# Patient Record
Sex: MALE | NOT HISPANIC OR LATINO
[De-identification: names, ages, dates, MRNs, and addresses within clinical notes are randomized per-mention and may not be internally consistent; named-entity substitution may affect disease eponyms.]

---

## 2017-01-18 ENCOUNTER — MEDICATION RENEWAL (OUTPATIENT)
Age: 67
End: 2017-01-18

## 2017-03-01 ENCOUNTER — NON-APPOINTMENT (OUTPATIENT)
Age: 67
End: 2017-03-01

## 2017-03-01 ENCOUNTER — APPOINTMENT (OUTPATIENT)
Dept: CARDIOLOGY | Facility: CLINIC | Age: 67
End: 2017-03-01

## 2017-03-01 VITALS
WEIGHT: 130 LBS | DIASTOLIC BLOOD PRESSURE: 75 MMHG | HEIGHT: 66 IN | SYSTOLIC BLOOD PRESSURE: 127 MMHG | BODY MASS INDEX: 20.89 KG/M2 | OXYGEN SATURATION: 99 % | RESPIRATION RATE: 16 BRPM | HEART RATE: 72 BPM

## 2017-03-01 RX ORDER — METOPROLOL TARTRATE 25 MG/1
25 TABLET, FILM COATED ORAL TWICE DAILY
Qty: 180 | Refills: 3 | Status: DISCONTINUED | COMMUNITY
Start: 2017-01-18 | End: 2017-03-01

## 2017-04-10 ENCOUNTER — RX RENEWAL (OUTPATIENT)
Age: 67
End: 2017-04-10

## 2017-06-20 ENCOUNTER — APPOINTMENT (OUTPATIENT)
Dept: CARDIOLOGY | Facility: CLINIC | Age: 67
End: 2017-06-20

## 2017-07-10 ENCOUNTER — APPOINTMENT (OUTPATIENT)
Dept: CARDIOLOGY | Facility: CLINIC | Age: 67
End: 2017-07-10

## 2017-07-10 ENCOUNTER — NON-APPOINTMENT (OUTPATIENT)
Age: 67
End: 2017-07-10

## 2017-07-10 VITALS
DIASTOLIC BLOOD PRESSURE: 69 MMHG | HEART RATE: 88 BPM | WEIGHT: 134 LBS | HEIGHT: 66 IN | OXYGEN SATURATION: 97 % | SYSTOLIC BLOOD PRESSURE: 112 MMHG | BODY MASS INDEX: 21.53 KG/M2

## 2017-07-10 DIAGNOSIS — R42 DIZZINESS AND GIDDINESS: ICD-10-CM

## 2017-07-10 DIAGNOSIS — H53.8 OTHER VISUAL DISTURBANCES: ICD-10-CM

## 2017-07-19 ENCOUNTER — APPOINTMENT (OUTPATIENT)
Dept: CV DIAGNOSITCS | Facility: HOSPITAL | Age: 67
End: 2017-07-19

## 2017-08-11 ENCOUNTER — APPOINTMENT (OUTPATIENT)
Dept: CV DIAGNOSITCS | Facility: HOSPITAL | Age: 67
End: 2017-08-11

## 2017-10-18 ENCOUNTER — NON-APPOINTMENT (OUTPATIENT)
Age: 67
End: 2017-10-18

## 2017-10-18 ENCOUNTER — APPOINTMENT (OUTPATIENT)
Dept: CARDIOLOGY | Facility: CLINIC | Age: 67
End: 2017-10-18
Payer: COMMERCIAL

## 2017-10-18 VITALS
HEART RATE: 76 BPM | RESPIRATION RATE: 16 BRPM | OXYGEN SATURATION: 97 % | HEIGHT: 66 IN | DIASTOLIC BLOOD PRESSURE: 81 MMHG | SYSTOLIC BLOOD PRESSURE: 134 MMHG | WEIGHT: 134 LBS | BODY MASS INDEX: 21.53 KG/M2

## 2017-10-18 PROCEDURE — 99214 OFFICE O/P EST MOD 30 MIN: CPT

## 2017-10-18 PROCEDURE — 93000 ELECTROCARDIOGRAM COMPLETE: CPT

## 2017-12-25 ENCOUNTER — EMERGENCY (EMERGENCY)
Facility: HOSPITAL | Age: 67
LOS: 1 days | Discharge: HOME | End: 2017-12-25
Attending: EMERGENCY MEDICINE | Admitting: EMERGENCY MEDICINE
Payer: COMMERCIAL

## 2017-12-25 VITALS
SYSTOLIC BLOOD PRESSURE: 176 MMHG | DIASTOLIC BLOOD PRESSURE: 72 MMHG | TEMPERATURE: 98 F | HEART RATE: 116 BPM | OXYGEN SATURATION: 99 %

## 2017-12-25 LAB
ALBUMIN SERPL ELPH-MCNC: 4.5 G/DL — SIGNIFICANT CHANGE UP (ref 3.3–5)
ALP SERPL-CCNC: 73 U/L — SIGNIFICANT CHANGE UP (ref 40–120)
ALT FLD-CCNC: 87 U/L — HIGH (ref 4–41)
APTT BLD: 32.9 SEC — SIGNIFICANT CHANGE UP (ref 27.5–37.4)
AST SERPL-CCNC: 57 U/L — HIGH (ref 4–40)
BASOPHILS # BLD AUTO: 0.09 K/UL — SIGNIFICANT CHANGE UP (ref 0–0.2)
BASOPHILS NFR BLD AUTO: 1.1 % — SIGNIFICANT CHANGE UP (ref 0–2)
BILIRUB SERPL-MCNC: 0.3 MG/DL — SIGNIFICANT CHANGE UP (ref 0.2–1.2)
BUN SERPL-MCNC: 14 MG/DL — SIGNIFICANT CHANGE UP (ref 7–23)
CALCIUM SERPL-MCNC: 9.5 MG/DL — SIGNIFICANT CHANGE UP (ref 8.4–10.5)
CHLORIDE SERPL-SCNC: 96 MMOL/L — LOW (ref 98–107)
CK MB BLD-MCNC: 1.84 NG/ML — SIGNIFICANT CHANGE UP (ref 1–6.6)
CK SERPL-CCNC: 98 U/L — SIGNIFICANT CHANGE UP (ref 30–200)
CO2 SERPL-SCNC: 24 MMOL/L — SIGNIFICANT CHANGE UP (ref 22–31)
CREAT SERPL-MCNC: 1.24 MG/DL — SIGNIFICANT CHANGE UP (ref 0.5–1.3)
EOSINOPHIL # BLD AUTO: 0.16 K/UL — SIGNIFICANT CHANGE UP (ref 0–0.5)
EOSINOPHIL NFR BLD AUTO: 2 % — SIGNIFICANT CHANGE UP (ref 0–6)
GLUCOSE SERPL-MCNC: 141 MG/DL — HIGH (ref 70–99)
HCT VFR BLD CALC: 47.1 % — SIGNIFICANT CHANGE UP (ref 39–50)
HGB BLD-MCNC: 15.1 G/DL — SIGNIFICANT CHANGE UP (ref 13–17)
IMM GRANULOCYTES # BLD AUTO: 0.06 # — SIGNIFICANT CHANGE UP
IMM GRANULOCYTES NFR BLD AUTO: 0.7 % — SIGNIFICANT CHANGE UP (ref 0–1.5)
INR BLD: 0.95 — SIGNIFICANT CHANGE UP (ref 0.88–1.17)
LYMPHOCYTES # BLD AUTO: 2.68 K/UL — SIGNIFICANT CHANGE UP (ref 1–3.3)
LYMPHOCYTES # BLD AUTO: 33.2 % — SIGNIFICANT CHANGE UP (ref 13–44)
MCHC RBC-ENTMCNC: 27.8 PG — SIGNIFICANT CHANGE UP (ref 27–34)
MCHC RBC-ENTMCNC: 32.1 % — SIGNIFICANT CHANGE UP (ref 32–36)
MCV RBC AUTO: 86.6 FL — SIGNIFICANT CHANGE UP (ref 80–100)
MONOCYTES # BLD AUTO: 0.8 K/UL — SIGNIFICANT CHANGE UP (ref 0–0.9)
MONOCYTES NFR BLD AUTO: 9.9 % — SIGNIFICANT CHANGE UP (ref 2–14)
NEUTROPHILS # BLD AUTO: 4.29 K/UL — SIGNIFICANT CHANGE UP (ref 1.8–7.4)
NEUTROPHILS NFR BLD AUTO: 53.1 % — SIGNIFICANT CHANGE UP (ref 43–77)
NRBC # FLD: 0.02 — SIGNIFICANT CHANGE UP
PLATELET # BLD AUTO: 289 K/UL — SIGNIFICANT CHANGE UP (ref 150–400)
PMV BLD: 9.9 FL — SIGNIFICANT CHANGE UP (ref 7–13)
POTASSIUM SERPL-MCNC: 4.6 MMOL/L — SIGNIFICANT CHANGE UP (ref 3.5–5.3)
POTASSIUM SERPL-SCNC: 4.6 MMOL/L — SIGNIFICANT CHANGE UP (ref 3.5–5.3)
PROT SERPL-MCNC: 8 G/DL — SIGNIFICANT CHANGE UP (ref 6–8.3)
PROTHROM AB SERPL-ACNC: 10.9 SEC — SIGNIFICANT CHANGE UP (ref 9.8–13.1)
RBC # BLD: 5.44 M/UL — SIGNIFICANT CHANGE UP (ref 4.2–5.8)
RBC # FLD: 13.9 % — SIGNIFICANT CHANGE UP (ref 10.3–14.5)
SODIUM SERPL-SCNC: 139 MMOL/L — SIGNIFICANT CHANGE UP (ref 135–145)
TROPONIN T SERPL-MCNC: < 0.06 NG/ML — SIGNIFICANT CHANGE UP (ref 0–0.06)
WBC # BLD: 8.08 K/UL — SIGNIFICANT CHANGE UP (ref 3.8–10.5)
WBC # FLD AUTO: 8.08 K/UL — SIGNIFICANT CHANGE UP (ref 3.8–10.5)

## 2017-12-25 PROCEDURE — 71020: CPT | Mod: 26

## 2017-12-25 PROCEDURE — 99285 EMERGENCY DEPT VISIT HI MDM: CPT

## 2017-12-25 RX ORDER — ASPIRIN/CALCIUM CARB/MAGNESIUM 324 MG
162 TABLET ORAL ONCE
Qty: 0 | Refills: 0 | Status: COMPLETED | OUTPATIENT
Start: 2017-12-25 | End: 2017-12-25

## 2017-12-25 RX ORDER — SODIUM CHLORIDE 9 MG/ML
500 INJECTION INTRAMUSCULAR; INTRAVENOUS; SUBCUTANEOUS ONCE
Qty: 0 | Refills: 0 | Status: COMPLETED | OUTPATIENT
Start: 2017-12-25 | End: 2017-12-25

## 2017-12-25 RX ADMIN — SODIUM CHLORIDE 500 MILLILITER(S): 9 INJECTION INTRAMUSCULAR; INTRAVENOUS; SUBCUTANEOUS at 23:43

## 2017-12-25 RX ADMIN — Medication 162 MILLIGRAM(S): at 22:38

## 2017-12-25 NOTE — ED ADULT NURSE NOTE - OBJECTIVE STATEMENT
Michelle RN: The patient is a 66 y/o male a&ox4 p/w a c/c of "chest heaviness and shortness of breath," that began at 1930 hours this evening while watching television.  The patient reported he acutely began to experience the sensation of palpitations and shortness of breath while watching television, took his HR and BP at home, noted HR in 120s and SBP above 180.  The patient has a PMH of HTN, HLD, DM type 2, BPH, and AMI in 2012 with stent placement (patient unable to elaborate on number of stents.)  The patient reported the sensation of chest heaviness and SOB lasted about 1.5 hours, at present denies CP, SOB, palpitations.  Patient noted to be hypertensive and tachycardic (sinus) at this time.  Patient denies HA, blurry vision, diplopia, N/V/D, diaphoresis.  Patient in nad, 20 gauge PIV placed in right AC, will continue to monitor.

## 2017-12-25 NOTE — ED ADULT TRIAGE NOTE - CCCP TRG CHIEF CMPLNT
- Unclear source  - Prior bx/IVUS results negative for CAV  - start broad spec abx including vanco x1 , continue cefepime and azithromycin (renal dose)  - consult Transplant ID for assistance  - Pan culture including respiratory panel, flu and strep -yana, CMV pending.   - Bcx NGTD  - CXR + for recollection to the R- but not significant for repeat paracentesis.  - CT chest w/ Moderate volume right pleural fluid that is worsening (chronic).  Bilateral diffuse patchy consolidation and groundglass densities that are new since radiograph 11/30/2017 and appear worse than chest radiograph 12/10/2017.  - DDx: Pneumonia   - Per ID, thoracentesis if possible. Appreciate help of Pulmonary    - may have paracentesis Friday for volume removal, will space out procedures   - no symptoms of UTI, however GNR, lactose  > 100,000 in urine (do not need to treat per ID)  - patient states cough medicine does not work, afebrile since admit. Will treat symptoms accordingly. (try promethazine-codeine today)   chest heaviness

## 2017-12-25 NOTE — ED PROVIDER NOTE - OBJECTIVE STATEMENT
67 year-old male with history of CAD, HTN, HLD, DM2, PCI on Plavix presents to the Emergency Department for chest pain.  Patient mentions the discomfort started approx. 4 hours ago 67 year-old male with history of CAD, HTN, HLD, DM2, PCI on Plavix presents to the Emergency Department for chest pain.  Patient mentions the discomfort started approx. 4 hours ago.  CP left-sided in nature with SOB.  No fevers, chills, nausea, vomiting, diaphoresis.  Took BP at home and was 180/120.  Mentions taking his Lopressor and coming in to the ED for evaluation.  Received ASA by EMS.  Mentions having no CP, SOB at this time.  No LE swelling, no complaints of orthopnea. 67 year-old male with history of CAD, HTN, HLD, DM2, PCI on Plavix presents to the Emergency Department for chest pain.  Patient mentions the discomfort started approx. 4 hours ago.  CP left-sided in nature with SOB.  No fevers, chills, nausea, vomiting, diaphoresis.  Took BP at home and was 180/120.  Mentions taking his Lopressor and coming in to the ED for evaluation.  Received ASA by EMS.  Mentions having no CP, SOB at this time.  No LE swelling, no complaints of orthopnea.    Attending/Jc: 68 yo M w/ extensive cardiac hx including cath/stent p/w acute onset of SSCP ~1930 tonight described as pressure-like and associated w/ mild SOB. Pain lasted ~ 2 hours. denies palp weakness, diaphoresis or n/v.

## 2017-12-25 NOTE — ED ADULT TRIAGE NOTE - CHIEF COMPLAINT QUOTE
c.o left sided chest heaviness and shortness of breath since 7pm while watching tv. took blood pressure at home with 180 systolic and 120 hr. took 25 lopressor ER at 730pm. pmh HTN MI with 1 stent DM

## 2017-12-25 NOTE — ED PROVIDER NOTE - MEDICAL DECISION MAKING DETAILS
A/P 68 yo M r/o ACS  -EKG, labs, CXR A/P 68 yo M r/o ACS  -EKG, labs, CXR    Kimi BRANNON: Patient p/w CP + SOB now resolved with prior history of cardiac problems.  Likely unstable angina.  R/O ACS and admit for echo + serial troponin.

## 2017-12-25 NOTE — ED PROVIDER NOTE - PHYSICAL EXAMINATION
Attending/Jc: NAD; PERRL/EOMI, non-icterus, supple, no KENIA, no JVD, RRR, CTAB; Abd-soft, NT/ND, no HSM; no LE edema, A&Ox3, nonfocal; Skin-warm/dry

## 2017-12-26 VITALS
TEMPERATURE: 98 F | SYSTOLIC BLOOD PRESSURE: 104 MMHG | HEART RATE: 76 BPM | DIASTOLIC BLOOD PRESSURE: 62 MMHG | OXYGEN SATURATION: 97 % | RESPIRATION RATE: 16 BRPM

## 2017-12-26 DIAGNOSIS — Z95.5 PRESENCE OF CORONARY ANGIOPLASTY IMPLANT AND GRAFT: Chronic | ICD-10-CM

## 2017-12-26 LAB
CK MB BLD-MCNC: 1.49 NG/ML — SIGNIFICANT CHANGE UP (ref 1–6.6)
CK SERPL-CCNC: 83 U/L — SIGNIFICANT CHANGE UP (ref 30–200)
TROPONIN T SERPL-MCNC: < 0.06 NG/ML — SIGNIFICANT CHANGE UP (ref 0–0.06)

## 2017-12-26 PROCEDURE — 93018 CV STRESS TEST I&R ONLY: CPT | Mod: GC

## 2017-12-26 PROCEDURE — 93306 TTE W/DOPPLER COMPLETE: CPT | Mod: 26

## 2017-12-26 PROCEDURE — 93016 CV STRESS TEST SUPVJ ONLY: CPT | Mod: GC

## 2017-12-26 PROCEDURE — 99235 HOSP IP/OBS SAME DATE MOD 70: CPT

## 2017-12-26 PROCEDURE — 78452 HT MUSCLE IMAGE SPECT MULT: CPT | Mod: 26

## 2017-12-26 RX ORDER — LATANOPROST 0.05 MG/ML
1 SOLUTION/ DROPS OPHTHALMIC; TOPICAL AT BEDTIME
Qty: 0 | Refills: 0 | Status: DISCONTINUED | OUTPATIENT
Start: 2017-12-26 | End: 2017-12-29

## 2017-12-26 RX ORDER — LISINOPRIL 2.5 MG/1
2.5 TABLET ORAL ONCE
Qty: 0 | Refills: 0 | Status: COMPLETED | OUTPATIENT
Start: 2017-12-26 | End: 2017-12-26

## 2017-12-26 RX ORDER — SIMVASTATIN 20 MG/1
40 TABLET, FILM COATED ORAL AT BEDTIME
Qty: 0 | Refills: 0 | Status: DISCONTINUED | OUTPATIENT
Start: 2017-12-26 | End: 2017-12-29

## 2017-12-26 RX ORDER — ASPIRIN/CALCIUM CARB/MAGNESIUM 324 MG
81 TABLET ORAL DAILY
Qty: 0 | Refills: 0 | Status: DISCONTINUED | OUTPATIENT
Start: 2017-12-26 | End: 2017-12-29

## 2017-12-26 RX ORDER — CLOPIDOGREL BISULFATE 75 MG/1
75 TABLET, FILM COATED ORAL DAILY
Qty: 0 | Refills: 0 | Status: DISCONTINUED | OUTPATIENT
Start: 2017-12-26 | End: 2017-12-29

## 2017-12-26 RX ORDER — METOPROLOL TARTRATE 50 MG
25 TABLET ORAL DAILY
Qty: 0 | Refills: 0 | Status: DISCONTINUED | OUTPATIENT
Start: 2017-12-26 | End: 2017-12-26

## 2017-12-26 RX ORDER — METOPROLOL TARTRATE 50 MG
25 TABLET ORAL ONCE
Qty: 0 | Refills: 0 | Status: COMPLETED | OUTPATIENT
Start: 2017-12-26 | End: 2017-12-26

## 2017-12-26 RX ORDER — METOPROLOL TARTRATE 50 MG
25 TABLET ORAL DAILY
Qty: 0 | Refills: 0 | Status: DISCONTINUED | OUTPATIENT
Start: 2017-12-26 | End: 2017-12-29

## 2017-12-26 RX ORDER — LISINOPRIL 2.5 MG/1
2.5 TABLET ORAL DAILY
Qty: 0 | Refills: 0 | Status: DISCONTINUED | OUTPATIENT
Start: 2017-12-26 | End: 2017-12-29

## 2017-12-26 RX ORDER — METFORMIN HYDROCHLORIDE 850 MG/1
1000 TABLET ORAL
Qty: 0 | Refills: 0 | Status: DISCONTINUED | OUTPATIENT
Start: 2017-12-26 | End: 2017-12-29

## 2017-12-26 RX ADMIN — Medication 81 MILLIGRAM(S): at 12:57

## 2017-12-26 RX ADMIN — Medication 25 MILLIGRAM(S): at 00:27

## 2017-12-26 RX ADMIN — LISINOPRIL 2.5 MILLIGRAM(S): 2.5 TABLET ORAL at 10:07

## 2017-12-26 RX ADMIN — METFORMIN HYDROCHLORIDE 1000 MILLIGRAM(S): 850 TABLET ORAL at 10:07

## 2017-12-26 RX ADMIN — CLOPIDOGREL BISULFATE 75 MILLIGRAM(S): 75 TABLET, FILM COATED ORAL at 12:57

## 2017-12-26 RX ADMIN — LISINOPRIL 2.5 MILLIGRAM(S): 2.5 TABLET ORAL at 00:27

## 2017-12-26 NOTE — ED CDU PROVIDER SUBSEQUENT DAY NOTE - HISTORY
67 year-old male with history of CAD, HTN, HLD, DM2, PCI on Plavix presents to the Emergency Department for chest pain.  Patient mentions the discomfort started yesterday.  CP left-sided in nature with SOB.  No fevers, chills, nausea, vomiting, diaphoresis.  Took BP at home and was high which prompted patient to come to ED. Received ASA by EMS.  Pt with 2set of Lula negative, sent to cdu for  echo, telemonitoring, monitor and reassess. Patient laying comfortably in bed NAD,pt denies n/v, sob, cp, abd pain or any other complaints.

## 2017-12-26 NOTE — ED CDU PROVIDER INITIAL DAY NOTE - MEDICAL DECISION MAKING DETAILS
66 y/o M with c/o cp with 1st set of Lula negative, sent to cdu for 2nd set of Lula, echo, telemonitoring, monitor and reassess

## 2017-12-26 NOTE — ED CDU PROVIDER DISPOSITION NOTE - CLINICAL COURSE
12/25/17: 68yo M pmh inclusive of htn, hld, DM, CAD, s/p PCI w/ stent placement on plavix, presented to ED c/o left-sided chest pain/presssure associated w/ sob. Admitted to CDU for serial enzymes and echo.  12/26/17: I evaluated pt this morning. Pt denied any chest discomfort at that time, but given concerning story - decided to add stress test as well. Pt has thus far had 3 negative sets of Lula, normal echo and normal nuclear stress test. Will dc home and pt will f/u with cardiologist and pmd. Pt understands/agrees w/ plan.

## 2017-12-26 NOTE — ED CDU PROVIDER SUBSEQUENT DAY NOTE - MEDICAL DECISION MAKING DETAILS
68 y/o male c/o cp sob and high bp  -r/o acs  -tele, ce x 2, echo, stress  -cardiolgy follow up 66 y/o male c/o cp, sob and high bp  -3sets Lula, echo, cardiac stress test  -tele, ce x 2, echo, stress  -cardiolgy follow up

## 2017-12-26 NOTE — ED CDU PROVIDER SUBSEQUENT DAY NOTE - ATTENDING CONTRIBUTION TO CARE
CDU MD Aguirre:  I performed a face to face bedside interview with patient regarding history of present illness, review of symptoms and past medical history. I completed an independent physical exam(documented below).  I have discussed patient's plan of care with PA.   I agree with note as stated above, having amended the EMR as needed to reflect my findings. I have discussed the assessment and plan of care.  This includes during the time I functioned as the attending physician for this patient.   Gen: Alert, NAD  Head: NC, AT, EOMI, normal lids/conjunctiva  Neck: +supple, no tenderness/meningismus/JVD, +Trachea midline  Chest: no chest wall tenderness, equal chest rise  Pulm: Bilateral BS, normal resp effort, no wheeze/stridor/retractions  CV: RRR, no M/R/G, +dist pulses  Neuro: AAOx3

## 2017-12-26 NOTE — ED CDU PROVIDER DISPOSITION NOTE - ATTENDING CONTRIBUTION TO CARE
*clinical course note above written by me.  CDU MD Aguirre- Attending Discharge Note: Patient is stable.  Labs and tests reviewed with the patient.  The patient is stable for discharge.

## 2017-12-26 NOTE — ED CDU PROVIDER INITIAL DAY NOTE - OBJECTIVE STATEMENT
67 year-old male with history of CAD, HTN, HLD, DM2, PCI on Plavix presents to the Emergency Department for chest pain.  Patient mentions the discomfort started approx. 4 hours ago.  CP left-sided in nature with SOB.  No fevers, chills, nausea, vomiting, diaphoresis.  Took BP at home and was 180/120.  Mentions taking his Lopressor and coming in to the ED for evaluation.  Received ASA by EMS.  Mentions having no CP, SOB at this time.  No LE swelling, no complaints of orthopnea. Pt with 1st set of Lula negative, sent to cdu for 2nd set of Lula, echo, telemonitoring, monitor and reassess. Patient laying comfortably in bed NAD, No complaints. 67 year-old male with history of CAD, HTN, HLD, DM2, PCI on Plavix presents to the Emergency Department for chest pain.  Patient mentions the discomfort started yesterday.  CP left-sided in nature with SOB.  No fevers, chills, nausea, vomiting, diaphoresis.  Took BP at home and was 180/120.  Mentions taking his Lopressor and coming in to the ED for evaluation.  Received ASA by EMS.  Pt with 1st set of Lula negative, sent to cdu for 2nd set of Lula, echo, telemonitoring, monitor and reassess. Patient laying comfortably in bed NAD,pt denies n/v, sob, cp, abd pain or any other complaints. 67 year-old male with history of CAD, HTN, HLD, DM2, PCI on Plavix presents to the Emergency Department for chest pain.  Patient mentions the discomfort started yesterday.  CP left-sided in nature with SOB.  No fevers, chills, nausea, vomiting, diaphoresis.  Took BP at home and was 180/120.  Mentions taking his Lopressor and coming in to the ED for evaluation.  Received ASA by EMS.  Pt with 1st set of Lula negative, sent to cdu for 2nd set of Lula, echo, telemonitoring, monitor and reassess. Patient laying comfortably in bed NAD,pt denies n/v, sob, cp, abd pain or any other complaints.    Attending/Jc: 68 yo M w/ extensive cardiac hx including cath/stent p/w acute onset of SSCP ~1930 tonight described as pressure-like and associated w/ mild SOB. Pain lasted ~ 2 hours. denies palp weakness, diaphoresis or n/v.     During course of evaluation in the ED patient's symptoms improved. Intial set of CE negative.

## 2017-12-26 NOTE — ED CDU PROVIDER SUBSEQUENT DAY NOTE - PROGRESS NOTE DETAILS
Morning Rounds: Pt. feeling well without complaint. States has had no cp or sob all night. Pt. states most recent cardiac testinf was 3 years ago - no recent stress or cath. Will add on stress to echo for further evaluation.

## 2018-01-17 ENCOUNTER — APPOINTMENT (OUTPATIENT)
Dept: CARDIOLOGY | Facility: CLINIC | Age: 68
End: 2018-01-17
Payer: COMMERCIAL

## 2018-01-17 VITALS
WEIGHT: 134 LBS | BODY MASS INDEX: 21.53 KG/M2 | OXYGEN SATURATION: 98 % | HEIGHT: 66 IN | RESPIRATION RATE: 16 BRPM | DIASTOLIC BLOOD PRESSURE: 81 MMHG | HEART RATE: 80 BPM | SYSTOLIC BLOOD PRESSURE: 143 MMHG

## 2018-01-17 VITALS — SYSTOLIC BLOOD PRESSURE: 110 MMHG | DIASTOLIC BLOOD PRESSURE: 70 MMHG

## 2018-01-17 PROCEDURE — 99215 OFFICE O/P EST HI 40 MIN: CPT

## 2018-01-17 PROCEDURE — 93000 ELECTROCARDIOGRAM COMPLETE: CPT

## 2018-02-05 ENCOUNTER — MEDICATION RENEWAL (OUTPATIENT)
Age: 68
End: 2018-02-05

## 2018-04-18 ENCOUNTER — NON-APPOINTMENT (OUTPATIENT)
Age: 68
End: 2018-04-18

## 2018-04-18 ENCOUNTER — APPOINTMENT (OUTPATIENT)
Dept: CARDIOLOGY | Facility: CLINIC | Age: 68
End: 2018-04-18
Payer: COMMERCIAL

## 2018-04-18 VITALS
HEART RATE: 76 BPM | RESPIRATION RATE: 16 BRPM | BODY MASS INDEX: 21.53 KG/M2 | DIASTOLIC BLOOD PRESSURE: 69 MMHG | HEIGHT: 66 IN | SYSTOLIC BLOOD PRESSURE: 113 MMHG | OXYGEN SATURATION: 98 % | WEIGHT: 134 LBS

## 2018-04-18 PROCEDURE — 93000 ELECTROCARDIOGRAM COMPLETE: CPT

## 2018-04-18 PROCEDURE — 99215 OFFICE O/P EST HI 40 MIN: CPT

## 2018-07-13 ENCOUNTER — MEDICATION RENEWAL (OUTPATIENT)
Age: 68
End: 2018-07-13

## 2018-07-18 ENCOUNTER — NON-APPOINTMENT (OUTPATIENT)
Age: 68
End: 2018-07-18

## 2018-07-18 ENCOUNTER — APPOINTMENT (OUTPATIENT)
Dept: CARDIOLOGY | Facility: CLINIC | Age: 68
End: 2018-07-18
Payer: COMMERCIAL

## 2018-07-18 VITALS
BODY MASS INDEX: 21.53 KG/M2 | OXYGEN SATURATION: 98 % | HEART RATE: 81 BPM | RESPIRATION RATE: 16 BRPM | DIASTOLIC BLOOD PRESSURE: 70 MMHG | WEIGHT: 134 LBS | HEIGHT: 66 IN | SYSTOLIC BLOOD PRESSURE: 111 MMHG

## 2018-07-18 PROBLEM — I25.10 ATHEROSCLEROTIC HEART DISEASE OF NATIVE CORONARY ARTERY WITHOUT ANGINA PECTORIS: Chronic | Status: ACTIVE | Noted: 2017-12-26

## 2018-07-18 PROBLEM — E78.5 HYPERLIPIDEMIA, UNSPECIFIED: Chronic | Status: ACTIVE | Noted: 2017-12-26

## 2018-07-18 PROCEDURE — 93000 ELECTROCARDIOGRAM COMPLETE: CPT

## 2018-07-18 PROCEDURE — 99215 OFFICE O/P EST HI 40 MIN: CPT

## 2018-10-24 ENCOUNTER — NON-APPOINTMENT (OUTPATIENT)
Age: 68
End: 2018-10-24

## 2018-10-24 ENCOUNTER — APPOINTMENT (OUTPATIENT)
Dept: CARDIOLOGY | Facility: CLINIC | Age: 68
End: 2018-10-24
Payer: COMMERCIAL

## 2018-10-24 VITALS
DIASTOLIC BLOOD PRESSURE: 69 MMHG | SYSTOLIC BLOOD PRESSURE: 110 MMHG | HEART RATE: 76 BPM | OXYGEN SATURATION: 100 % | BODY MASS INDEX: 22.11 KG/M2 | WEIGHT: 137 LBS

## 2018-10-24 PROCEDURE — 93000 ELECTROCARDIOGRAM COMPLETE: CPT

## 2018-10-24 PROCEDURE — 99215 OFFICE O/P EST HI 40 MIN: CPT

## 2019-01-29 ENCOUNTER — APPOINTMENT (OUTPATIENT)
Dept: CARDIOLOGY | Facility: CLINIC | Age: 69
End: 2019-01-29

## 2019-05-08 ENCOUNTER — APPOINTMENT (OUTPATIENT)
Dept: CARDIOLOGY | Facility: CLINIC | Age: 69
End: 2019-05-08
Payer: MEDICARE

## 2019-05-08 ENCOUNTER — NON-APPOINTMENT (OUTPATIENT)
Age: 69
End: 2019-05-08

## 2019-05-08 VITALS
HEART RATE: 89 BPM | WEIGHT: 135 LBS | SYSTOLIC BLOOD PRESSURE: 137 MMHG | RESPIRATION RATE: 16 BRPM | OXYGEN SATURATION: 100 % | DIASTOLIC BLOOD PRESSURE: 74 MMHG | BODY MASS INDEX: 21.69 KG/M2 | HEIGHT: 66 IN

## 2019-05-08 PROCEDURE — 93000 ELECTROCARDIOGRAM COMPLETE: CPT

## 2019-05-08 PROCEDURE — 99214 OFFICE O/P EST MOD 30 MIN: CPT

## 2019-05-08 RX ORDER — PANTOPRAZOLE 40 MG/1
40 TABLET, DELAYED RELEASE ORAL DAILY
Qty: 90 | Refills: 3 | Status: DISCONTINUED | COMMUNITY
Start: 2018-01-17 | End: 2019-05-08

## 2019-05-08 NOTE — PHYSICAL EXAM
[Normal Appearance] : normal appearance [General Appearance - Well Developed] : well developed [No Deformities] : no deformities [General Appearance - Well Nourished] : well nourished [Well Groomed] : well groomed [Normal Conjunctiva] : the conjunctiva exhibited no abnormalities [General Appearance - In No Acute Distress] : no acute distress [Eyelids - No Xanthelasma] : the eyelids demonstrated no xanthelasmas [Normal Oral Mucosa] : normal oral mucosa [No Oral Cyanosis] : no oral cyanosis [No Oral Pallor] : no oral pallor [No Jugular Venous Doe A Waves] : no jugular venous doe A waves [Normal Jugular Venous A Waves Present] : normal jugular venous A waves present [Normal Jugular Venous V Waves Present] : normal jugular venous V waves present [Respiration, Rhythm And Depth] : normal respiratory rhythm and effort [Auscultation Breath Sounds / Voice Sounds] : lungs were clear to auscultation bilaterally [Exaggerated Use Of Accessory Muscles For Inspiration] : no accessory muscle use [Heart Sounds] : normal S1 and S2 [Heart Rate And Rhythm] : heart rate and rhythm were normal [Abdomen Soft] : soft [Murmurs] : no murmurs present [Abnormal Walk] : normal gait [Abdomen Tenderness] : non-tender [Abdomen Mass (___ Cm)] : no abdominal mass palpated [Gait - Sufficient For Exercise Testing] : the gait was sufficient for exercise testing [Cyanosis, Localized] : no localized cyanosis [Nail Clubbing] : no clubbing of the fingernails [Skin Color & Pigmentation] : normal skin color and pigmentation [Petechial Hemorrhages (___cm)] : no petechial hemorrhages [No Venous Stasis] : no venous stasis [] : no rash [Skin Lesions] : no skin lesions [No Xanthoma] : no  xanthoma was observed [No Skin Ulcers] : no skin ulcer [Oriented To Time, Place, And Person] : oriented to person, place, and time [Mood] : the mood was normal [Affect] : the affect was normal [No Anxiety] : not feeling anxious

## 2019-05-08 NOTE — ASSESSMENT
[FreeTextEntry1] : 1. Coronary artery disease. No signs of angina. The patient wishes to stop his clopidogrel as he now has significant co-pay's on his medications. He is more than 5 years since coronary revascularization.\par \par 2. Hypertension. Blood pressure is adequately controlled.\par \par 3. Hyperlipidemia. Continue lovastatin.\par

## 2019-05-08 NOTE — REASON FOR VISIT
[Coronary Artery Disease] : coronary artery disease [Follow-Up - Clinic] : a clinic follow-up of [Hypertension] : hypertension [Hyperlipidemia] : hyperlipidemia [FreeTextEntry1] : Since his last visit, the patient has been suffering from seasonal allergies. He has had no chest pain or shortness of breath.\par \par 1. Coronary artery disease. No signs of angina. The patient wishes to stop his clopidogrel as he now has significant co-pay's on his medications. He is more than 5 years since coronary revascularization.\par \par 2. Hypertension. Blood pressure is adequately controlled.\par \par 3. Hyperlipidemia. Continue lovastatin.\par

## 2019-05-09 ENCOUNTER — MEDICATION RENEWAL (OUTPATIENT)
Age: 69
End: 2019-05-09

## 2019-07-19 ENCOUNTER — RX RENEWAL (OUTPATIENT)
Age: 69
End: 2019-07-19

## 2019-08-13 ENCOUNTER — APPOINTMENT (OUTPATIENT)
Dept: CARDIOLOGY | Facility: CLINIC | Age: 69
End: 2019-08-13
Payer: MEDICARE

## 2019-08-13 ENCOUNTER — NON-APPOINTMENT (OUTPATIENT)
Age: 69
End: 2019-08-13

## 2019-08-13 VITALS
OXYGEN SATURATION: 99 % | HEART RATE: 76 BPM | BODY MASS INDEX: 22.5 KG/M2 | DIASTOLIC BLOOD PRESSURE: 76 MMHG | RESPIRATION RATE: 16 BRPM | HEIGHT: 66 IN | SYSTOLIC BLOOD PRESSURE: 121 MMHG | WEIGHT: 140 LBS

## 2019-08-13 PROCEDURE — 93000 ELECTROCARDIOGRAM COMPLETE: CPT

## 2019-08-13 PROCEDURE — 99214 OFFICE O/P EST MOD 30 MIN: CPT

## 2019-08-13 NOTE — REASON FOR VISIT
[Follow-Up - Clinic] : a clinic follow-up of [Coronary Artery Disease] : coronary artery disease [Hyperlipidemia] : hyperlipidemia [Hypertension] : hypertension [FreeTextEntry1] : Since his last visit, he has had no chest pain or shortness of breath. He wished to discontinue his clopidogrel, but then has decided to continue taking it. He also wishes to change his visits to every 6 months.\par \par 1. Coronary artery disease. There are no signs of angina. Continue current medications.\par \par 2. Hypertension. His blood pressure is well controlled. Continue current medications.\par \par 3. Hyperlipidemia. Continue lovastatin.\par \par \par

## 2019-08-13 NOTE — ASSESSMENT
[FreeTextEntry1] : 1. Coronary artery disease. There are no signs of angina. Continue current medications.\par \par 2. Hypertension. His blood pressure is well controlled. Continue current medications.\par \par 3. Hyperlipidemia. Continue lovastatin.\par \par \par

## 2019-08-13 NOTE — PHYSICAL EXAM
[General Appearance - Well Developed] : well developed [Normal Appearance] : normal appearance [Well Groomed] : well groomed [General Appearance - Well Nourished] : well nourished [No Deformities] : no deformities [General Appearance - In No Acute Distress] : no acute distress [Normal Conjunctiva] : the conjunctiva exhibited no abnormalities [Eyelids - No Xanthelasma] : the eyelids demonstrated no xanthelasmas [Normal Oral Mucosa] : normal oral mucosa [No Oral Pallor] : no oral pallor [No Oral Cyanosis] : no oral cyanosis [Normal Jugular Venous A Waves Present] : normal jugular venous A waves present [Normal Jugular Venous V Waves Present] : normal jugular venous V waves present [No Jugular Venous Doe A Waves] : no jugular venous doe A waves [Respiration, Rhythm And Depth] : normal respiratory rhythm and effort [Exaggerated Use Of Accessory Muscles For Inspiration] : no accessory muscle use [Auscultation Breath Sounds / Voice Sounds] : lungs were clear to auscultation bilaterally [Heart Rate And Rhythm] : heart rate and rhythm were normal [Heart Sounds] : normal S1 and S2 [Murmurs] : no murmurs present [Abdomen Soft] : soft [Abdomen Tenderness] : non-tender [Abdomen Mass (___ Cm)] : no abdominal mass palpated [Abnormal Walk] : normal gait [Gait - Sufficient For Exercise Testing] : the gait was sufficient for exercise testing [Nail Clubbing] : no clubbing of the fingernails [Cyanosis, Localized] : no localized cyanosis [Petechial Hemorrhages (___cm)] : no petechial hemorrhages [Skin Color & Pigmentation] : normal skin color and pigmentation [] : no rash [No Venous Stasis] : no venous stasis [Skin Lesions] : no skin lesions [No Skin Ulcers] : no skin ulcer [No Xanthoma] : no  xanthoma was observed [Oriented To Time, Place, And Person] : oriented to person, place, and time [Affect] : the affect was normal [No Anxiety] : not feeling anxious [Mood] : the mood was normal

## 2019-09-23 NOTE — ED ADULT TRIAGE NOTE - STATUS:
Post Anesthesia Note





- EVALUATION WITHIN 48HRS OF ANESTHETIC


Vital Signs in Normal Range: Yes


Patient Participated in Evaluation: Yes


Respiratory Function Stable: Yes


Airway Patent: Yes


Cardiovascular Function Stable: Yes


Hydration Status Stable: Yes (Feels a little dizzy at times.)


Pain Control Satisfactory: Yes


Nausea and Vomiting Control Satisfactory: Yes (little nausea on and off. 

Sitting up in chair. Nurse with. )


Mental Status Recovered: Yes


Vital Signs: 


 Last Vital Signs











Temp  98.1 F   09/23/19 00:35


 


Pulse  69   09/23/19 04:31


 


Resp  17   09/23/19 00:35


 


BP  100/56 L  09/23/19 04:02


 


Pulse Ox  100   09/23/19 04:31
Intact

## 2020-02-18 ENCOUNTER — APPOINTMENT (OUTPATIENT)
Dept: CARDIOLOGY | Facility: CLINIC | Age: 70
End: 2020-02-18
Payer: MEDICARE

## 2020-02-18 ENCOUNTER — NON-APPOINTMENT (OUTPATIENT)
Age: 70
End: 2020-02-18

## 2020-02-18 VITALS — BODY MASS INDEX: 21.63 KG/M2 | WEIGHT: 134 LBS

## 2020-02-18 VITALS
HEIGHT: 66 IN | OXYGEN SATURATION: 98 % | HEART RATE: 83 BPM | SYSTOLIC BLOOD PRESSURE: 123 MMHG | DIASTOLIC BLOOD PRESSURE: 71 MMHG | BODY MASS INDEX: 21.63 KG/M2

## 2020-02-18 DIAGNOSIS — I25.10 ATHEROSCLEROTIC HEART DISEASE OF NATIVE CORONARY ARTERY W/OUT ANGINA PECTORIS: ICD-10-CM

## 2020-02-18 DIAGNOSIS — E78.5 HYPERLIPIDEMIA, UNSPECIFIED: ICD-10-CM

## 2020-02-18 PROCEDURE — 93000 ELECTROCARDIOGRAM COMPLETE: CPT

## 2020-02-18 PROCEDURE — 99214 OFFICE O/P EST MOD 30 MIN: CPT

## 2020-02-18 NOTE — ASSESSMENT
[FreeTextEntry1] : 1.  CAD.  No signs of angina.  Continue current medications.\par \par 2.  Hypertension.  Blood pressure is well controlled.\par \par 3.  Hyperlipidemia.  His blood work is being checked by his endocrinologist.  We will obtain his blood work.

## 2020-02-18 NOTE — REASON FOR VISIT
[Follow-Up - Clinic] : a clinic follow-up of [Coronary Artery Disease] : coronary artery disease [Hyperlipidemia] : hyperlipidemia [FreeTextEntry1] : Since his last visit, he has been doing well.  He has had no chest pain or shortness of breath.  He did not receive his cane, and consequently has not been walking.  His balance has worsened and he has lost muscle mass.\par \par His blood work is being checked by his endocrinologist, and his last hemoglobin A1c was approximately 8.  He also notes having had a vascular study performed, but we do not have the results.\par \par 1.  CAD.  No signs of angina.  Continue current medications.\par \par 2.  Hypertension.  Blood pressure is well controlled.\par \par 3.  Hyperlipidemia.  His blood work is being checked by his endocrinologist.  We will obtain his blood work. [Hypertension] : hypertension

## 2020-02-18 NOTE — PHYSICAL EXAM
[General Appearance - Well Developed] : well developed [Normal Appearance] : normal appearance [General Appearance - Well Nourished] : well nourished [Well Groomed] : well groomed [No Deformities] : no deformities [General Appearance - In No Acute Distress] : no acute distress [Normal Conjunctiva] : the conjunctiva exhibited no abnormalities [Normal Oral Mucosa] : normal oral mucosa [Eyelids - No Xanthelasma] : the eyelids demonstrated no xanthelasmas [No Oral Cyanosis] : no oral cyanosis [No Oral Pallor] : no oral pallor [Normal Jugular Venous V Waves Present] : normal jugular venous V waves present [Normal Jugular Venous A Waves Present] : normal jugular venous A waves present [No Jugular Venous Doe A Waves] : no jugular venous doe A waves [Respiration, Rhythm And Depth] : normal respiratory rhythm and effort [Auscultation Breath Sounds / Voice Sounds] : lungs were clear to auscultation bilaterally [Exaggerated Use Of Accessory Muscles For Inspiration] : no accessory muscle use [Heart Rate And Rhythm] : heart rate and rhythm were normal [Heart Sounds] : normal S1 and S2 [Murmurs] : no murmurs present [Abdomen Tenderness] : non-tender [Abdomen Soft] : soft [Abdomen Mass (___ Cm)] : no abdominal mass palpated [Abnormal Walk] : normal gait [Gait - Sufficient For Exercise Testing] : the gait was sufficient for exercise testing [Nail Clubbing] : no clubbing of the fingernails [Petechial Hemorrhages (___cm)] : no petechial hemorrhages [Cyanosis, Localized] : no localized cyanosis [Skin Color & Pigmentation] : normal skin color and pigmentation [] : no rash [No Venous Stasis] : no venous stasis [Skin Lesions] : no skin lesions [No Skin Ulcers] : no skin ulcer [No Xanthoma] : no  xanthoma was observed [Affect] : the affect was normal [Oriented To Time, Place, And Person] : oriented to person, place, and time [Mood] : the mood was normal [No Anxiety] : not feeling anxious

## 2020-07-30 NOTE — ED ADULT NURSE NOTE - CAS EDP DISCH TYPE
Patient is a 60 year old male that presents to ED arrives with wife with slurred and generalized weakness. Slurred speech started around 1145 today and weakness has been since yesterday per wife. Patient is currently AAOx3, disoriented to time. 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 Wife states patient's urine has been concentrated last couple days. Patient also has hx of multiple CVA, DM, HTN, and CHF. Denies any fevers, chills, and SOB.   
Pt has unknown LKW. Wife states he seemed a little weaker yesterday and has had some slurred speech since around 1145 today.  
Home

## 2020-09-01 ENCOUNTER — APPOINTMENT (OUTPATIENT)
Dept: CARDIOLOGY | Facility: CLINIC | Age: 70
End: 2020-09-01

## 2021-04-15 ENCOUNTER — RX RENEWAL (OUTPATIENT)
Age: 71
End: 2021-04-15

## 2021-05-05 ENCOUNTER — RX RENEWAL (OUTPATIENT)
Age: 71
End: 2021-05-05

## 2021-07-16 NOTE — ED PROVIDER NOTE - PROGRESS NOTE DETAILS
Lot # (Optional): [id number] Kimi BRANNON: Spoke with patient's PCP and he is in agreement to the plan of admitting patient to CDU for cardiac eval.

## 2022-02-16 ENCOUNTER — RX RENEWAL (OUTPATIENT)
Age: 72
End: 2022-02-16

## 2022-02-16 RX ORDER — METOPROLOL SUCCINATE 25 MG/1
25 TABLET, EXTENDED RELEASE ORAL DAILY
Qty: 90 | Refills: 3 | Status: ACTIVE | COMMUNITY
Start: 2017-03-01 | End: 1900-01-01

## 2022-04-21 ENCOUNTER — RX RENEWAL (OUTPATIENT)
Age: 72
End: 2022-04-21

## 2022-07-20 ENCOUNTER — RX RENEWAL (OUTPATIENT)
Age: 72
End: 2022-07-20

## 2022-07-20 RX ORDER — LOVASTATIN 20 MG/1
20 TABLET ORAL
Qty: 90 | Refills: 0 | Status: ACTIVE | COMMUNITY
Start: 2018-07-13 | End: 1900-01-01

## 2022-12-19 ENCOUNTER — OFFICE (OUTPATIENT)
Dept: URBAN - METROPOLITAN AREA CLINIC 35 | Facility: CLINIC | Age: 72
Setting detail: OPHTHALMOLOGY
End: 2022-12-19
Payer: MEDICARE

## 2022-12-19 ENCOUNTER — RX ONLY (RX ONLY)
Age: 72
End: 2022-12-19

## 2022-12-19 DIAGNOSIS — H02.31: ICD-10-CM

## 2022-12-19 DIAGNOSIS — E11.9: ICD-10-CM

## 2022-12-19 DIAGNOSIS — H02.34: ICD-10-CM

## 2022-12-19 DIAGNOSIS — H40.1131: ICD-10-CM

## 2022-12-19 DIAGNOSIS — H25.13: ICD-10-CM

## 2022-12-19 PROBLEM — H52.7 REFRACTIVE ERROR: Status: ACTIVE | Noted: 2022-12-19

## 2022-12-19 PROBLEM — H11.153 PINGUECULA; BOTH EYES: Status: ACTIVE | Noted: 2022-12-19

## 2022-12-19 PROCEDURE — 92004 COMPRE OPH EXAM NEW PT 1/>: CPT | Performed by: OPHTHALMOLOGY

## 2022-12-19 PROCEDURE — 92250 FUNDUS PHOTOGRAPHY W/I&R: CPT | Performed by: OPHTHALMOLOGY

## 2022-12-19 PROCEDURE — 92133 CPTRZD OPH DX IMG PST SGM ON: CPT | Performed by: OPHTHALMOLOGY

## 2022-12-19 ASSESSMENT — AXIALLENGTH_DERIVED
OD_AL: 21.8306
OS_AL: 22.1215
OS_AL: 21.9516
OD_AL: 22.0411

## 2022-12-19 ASSESSMENT — REFRACTION_CURRENTRX
OS_ADD: +3.00
OD_AXIS: 010
OS_AXIS: 175
OD_CYLINDER: +0.75
OD_ADD: +3.00
OS_SPHERE: +1.75
OS_CYLINDER: +0.75
OD_SPHERE: +2.00
OS_VPRISM_DIRECTION: PROGS
OD_OVR_VA: 20/
OS_OVR_VA: 20/
OD_VPRISM_DIRECTION: PROGS

## 2022-12-19 ASSESSMENT — CONFRONTATIONAL VISUAL FIELD TEST (CVF)
OD_FINDINGS: FULL
OS_FINDINGS: FULL

## 2022-12-19 ASSESSMENT — SPHEQUIV_DERIVED
OD_SPHEQUIV: 2.375
OD_SPHEQUIV: 3
OS_SPHEQUIV: 2.375
OS_SPHEQUIV: 2.875

## 2022-12-19 ASSESSMENT — REFRACTION_MANIFEST
OS_CYLINDER: +1.25
OD_AXIS: 180
OS_SPHERE: +1.75
OS_AXIS: 180
OD_CYLINDER: +0.75
OS_VA1: 20/40
OD_VA1: 20/20
OD_SPHERE: +2.00

## 2022-12-19 ASSESSMENT — TONOMETRY
OS_IOP_MMHG: 12
OD_IOP_MMHG: 11

## 2022-12-19 ASSESSMENT — REFRACTION_AUTOREFRACTION
OS_AXIS: 165
OD_CYLINDER: +1.50
OS_SPHERE: +2.00
OD_AXIS: 005
OS_CYLINDER: +1.75
OD_SPHERE: +2.25

## 2022-12-19 ASSESSMENT — VISUAL ACUITY
OD_BCVA: 20/60+2
OS_BCVA: 20/20-

## 2022-12-19 ASSESSMENT — KERATOMETRY
OD_AXISANGLE_DEGREES: 002
OS_K2POWER_DIOPTERS: 45.75
OD_K1POWER_DIOPTERS: 45.00
OD_K2POWER_DIOPTERS: 46.00
OS_K1POWER_DIOPTERS: 44.75
OS_AXISANGLE_DEGREES: 168

## 2022-12-19 ASSESSMENT — LID POSITION - COMMENTS
OD_COMMENTS: BLEPHAROCHALASIS
OS_COMMENTS: BLEPHAROCHALASIS

## 2023-01-26 NOTE — ED CDU PROVIDER SUBSEQUENT DAY NOTE - MUSCULOSKELETAL, MLM
Monitor: The problem is stable.  Evaluation: No labs/tests required today.  Assessment/Treatment:  Managed by specialist.   Referral to cardiology provided to patient. Will try to get sooner appt to establish care.  Recommended to continue entresto 24-26 BID, diltiazem 240mg daily, amlodipine 10mg daily, and ramipril 10mg daily.  Will increase dose of lasix to 40mg BID.  Had physician who is coming to his home currently for management of his care.  Advised to follow up in 4 weeks.   Spine appears normal, range of motion is not limited, no muscle or joint tenderness

## 2023-04-11 ENCOUNTER — OFFICE (OUTPATIENT)
Dept: URBAN - METROPOLITAN AREA CLINIC 35 | Facility: CLINIC | Age: 73
Setting detail: OPHTHALMOLOGY
End: 2023-04-11
Payer: MEDICARE

## 2023-04-11 DIAGNOSIS — H02.31: ICD-10-CM

## 2023-04-11 DIAGNOSIS — H40.1131: ICD-10-CM

## 2023-04-11 DIAGNOSIS — H25.13: ICD-10-CM

## 2023-04-11 DIAGNOSIS — H02.34: ICD-10-CM

## 2023-04-11 PROCEDURE — 92012 INTRM OPH EXAM EST PATIENT: CPT | Performed by: OPHTHALMOLOGY

## 2023-04-11 PROCEDURE — 92083 EXTENDED VISUAL FIELD XM: CPT | Performed by: OPHTHALMOLOGY

## 2023-04-11 ASSESSMENT — REFRACTION_CURRENTRX
OS_ADD: +3.00
OD_AXIS: 010
OS_OVR_VA: 20/
OS_VPRISM_DIRECTION: PROGS
OS_ADD: +3.00
OD_ADD: +3.00
OS_CYLINDER: +0.75
OS_AXIS: 178
OS_SPHERE: +1.75
OD_ADD: +3.00
OS_CYLINDER: +0.75
OS_OVR_VA: 20/
OD_VPRISM_DIRECTION: PROGS
OD_OVR_VA: 20/
OS_VPRISM_DIRECTION: PROGS
OD_CYLINDER: +0.75
OD_OVR_VA: 20/
OD_SPHERE: +2.00
OS_AXIS: 175
OD_CYLINDER: +0.75
OD_VPRISM_DIRECTION: PROGS
OD_SPHERE: +1.75
OD_AXIS: 010
OS_SPHERE: +2.00

## 2023-04-11 ASSESSMENT — KERATOMETRY
OS_K2POWER_DIOPTERS: 45.75
OS_K1POWER_DIOPTERS: 44.75
OS_AXISANGLE_DEGREES: 177
OD_AXISANGLE_DEGREES: 011
OD_K2POWER_DIOPTERS: 45.75
OD_K1POWER_DIOPTERS: 45.50

## 2023-04-11 ASSESSMENT — VISUAL ACUITY
OD_BCVA: 20/30-2
OS_BCVA: 20/40

## 2023-04-11 ASSESSMENT — SPHEQUIV_DERIVED
OD_SPHEQUIV: 3
OS_SPHEQUIV: 2.375
OD_SPHEQUIV: 2.375
OS_SPHEQUIV: 2.75

## 2023-04-11 ASSESSMENT — REFRACTION_AUTOREFRACTION
OS_CYLINDER: +1.50
OD_SPHERE: +2.25
OS_SPHERE: +2.00
OD_CYLINDER: +1.50
OD_AXIS: 014
OS_AXIS: 170

## 2023-04-11 ASSESSMENT — REFRACTION_MANIFEST
OS_VA1: 20/40
OS_SPHERE: +1.75
OD_SPHERE: +2.00
OS_AXIS: 180
OD_AXIS: 180
OD_VA1: 20/20
OS_CYLINDER: +1.25
OD_CYLINDER: +0.75

## 2023-04-11 ASSESSMENT — AXIALLENGTH_DERIVED
OD_AL: 21.7913
OS_AL: 22.1215
OS_AL: 21.9938
OD_AL: 22.0011

## 2023-04-11 ASSESSMENT — TONOMETRY
OS_IOP_MMHG: 10
OD_IOP_MMHG: 10

## 2023-04-11 ASSESSMENT — LID POSITION - COMMENTS
OS_COMMENTS: BLEPHAROCHALASIS
OD_COMMENTS: BLEPHAROCHALASIS

## 2023-08-14 ENCOUNTER — OFFICE (OUTPATIENT)
Dept: URBAN - METROPOLITAN AREA CLINIC 35 | Facility: CLINIC | Age: 73
Setting detail: OPHTHALMOLOGY
End: 2023-08-14
Payer: MEDICARE

## 2023-08-14 DIAGNOSIS — H25.13: ICD-10-CM

## 2023-08-14 DIAGNOSIS — H40.1131: ICD-10-CM

## 2023-08-14 DIAGNOSIS — H16.103: ICD-10-CM

## 2023-08-14 DIAGNOSIS — H11.153: ICD-10-CM

## 2023-08-14 DIAGNOSIS — E11.9: ICD-10-CM

## 2023-08-14 PROCEDURE — 92012 INTRM OPH EXAM EST PATIENT: CPT | Performed by: OPHTHALMOLOGY

## 2023-08-14 ASSESSMENT — REFRACTION_CURRENTRX
OS_AXIS: 172
OD_AXIS: 010
OS_OVR_VA: 20/
OS_ADD: +3.00
OS_OVR_VA: 20/
OD_OVR_VA: 20/
OS_CYLINDER: +0.75
OD_SPHERE: +1.75
OS_CYLINDER: +0.75
OS_CYLINDER: +0.75
OS_SPHERE: +2.00
OD_SPHERE: +1.75
OD_VPRISM_DIRECTION: PROGS
OS_ADD: +2.75
OS_VPRISM_DIRECTION: PROGS
OD_AXIS: 010
OD_VPRISM_DIRECTION: PROGS
OS_OVR_VA: 20/
OD_VPRISM_DIRECTION: PROGS
OD_CYLINDER: +0.75
OD_OVR_VA: 20/
OS_AXIS: 175
OD_OVR_VA: 20/
OS_SPHERE: +1.75
OD_CYLINDER: +0.75
OD_AXIS: 008
OS_VPRISM_DIRECTION: PROGS
OS_AXIS: 178
OS_ADD: +3.00
OD_CYLINDER: +0.75
OD_ADD: +3.25
OS_VPRISM_DIRECTION: PROGS
OD_ADD: +3.00
OD_SPHERE: +2.00
OS_SPHERE: +2.00
OD_ADD: +3.00

## 2023-08-14 ASSESSMENT — REFRACTION_MANIFEST
OS_VA1: 20/40
OD_VA1: 20/20
OS_SPHERE: +1.75
OS_AXIS: 180
OD_AXIS: 180
OD_CYLINDER: +0.75
OS_CYLINDER: +1.25
OD_SPHERE: +2.00

## 2023-08-14 ASSESSMENT — TONOMETRY
OD_IOP_MMHG: 13
OS_IOP_MMHG: 13

## 2023-08-14 ASSESSMENT — SPHEQUIV_DERIVED
OS_SPHEQUIV: 2.375
OD_SPHEQUIV: 3.25
OD_SPHEQUIV: 2.375
OS_SPHEQUIV: 3

## 2023-08-14 ASSESSMENT — KERATOMETRY
OD_K1POWER_DIOPTERS: 45.25
OD_K2POWER_DIOPTERS: 46.00
OS_K2POWER_DIOPTERS: 45.75
OS_AXISANGLE_DEGREES: 175
OD_AXISANGLE_DEGREES: 004
OS_K1POWER_DIOPTERS: 44.75

## 2023-08-14 ASSESSMENT — AXIALLENGTH_DERIVED
OD_AL: 22.0011
OS_AL: 22.1215
OD_AL: 21.7086
OS_AL: 21.9095

## 2023-08-14 ASSESSMENT — REFRACTION_AUTOREFRACTION
OD_AXIS: 013
OD_CYLINDER: +2.00
OS_AXIS: 175
OD_SPHERE: +2.25
OS_SPHERE: +2.00
OS_CYLINDER: +2.00

## 2023-08-14 ASSESSMENT — VISUAL ACUITY
OS_BCVA: 20/50-2
OD_BCVA: 20/30-2

## 2023-08-14 ASSESSMENT — LID POSITION - COMMENTS
OD_COMMENTS: BLEPHAROCHALASIS
OS_COMMENTS: BLEPHAROCHALASIS

## 2025-07-04 ENCOUNTER — EMERGENCY (EMERGENCY)
Facility: HOSPITAL | Age: 75
LOS: 1 days | End: 2025-07-04
Attending: EMERGENCY MEDICINE

## 2025-07-04 VITALS
HEART RATE: 83 BPM | SYSTOLIC BLOOD PRESSURE: 168 MMHG | OXYGEN SATURATION: 97 % | DIASTOLIC BLOOD PRESSURE: 95 MMHG | TEMPERATURE: 98 F | WEIGHT: 134.92 LBS | HEIGHT: 66 IN | RESPIRATION RATE: 16 BRPM

## 2025-07-04 RX ORDER — ERYTHROMYCIN 5 MG/G
1 OINTMENT OPHTHALMIC
Qty: 1 | Refills: 0
Start: 2025-07-04 | End: 2025-07-10

## 2025-07-04 RX ORDER — ERYTHROMYCIN 5 MG/G
1 OINTMENT OPHTHALMIC ONCE
Refills: 0 | Status: COMPLETED | OUTPATIENT
Start: 2025-07-04 | End: 2025-07-04

## 2025-07-04 RX ORDER — FLUORESCEIN SODIUM 0.6 MG
1 STRIP OPHTHALMIC (EYE) ONCE
Refills: 0 | Status: COMPLETED | OUTPATIENT
Start: 2025-07-04 | End: 2025-07-04

## 2025-07-04 RX ORDER — TETRACAINE HYDROCHLORIDE 5 MG/ML
1 SOLUTION OPHTHALMIC ONCE
Refills: 0 | Status: COMPLETED | OUTPATIENT
Start: 2025-07-04 | End: 2025-07-04

## 2025-07-04 RX ORDER — ERYTHROMYCIN 5 MG/G
1 OINTMENT OPHTHALMIC
Refills: 0
Start: 2025-07-04

## 2025-07-04 RX ADMIN — Medication 1 APPLICATION(S): at 14:25

## 2025-07-04 RX ADMIN — ERYTHROMYCIN 1 APPLICATION(S): 5 OINTMENT OPHTHALMIC at 15:20

## 2025-07-04 RX ADMIN — TETRACAINE HYDROCHLORIDE 1 DROP(S): 5 SOLUTION OPHTHALMIC at 14:26

## 2025-07-04 NOTE — ED PROVIDER NOTE - NSFOLLOWUPCLINICS_GEN_ALL_ED_FT
[facility] - Ophthalmology  Ophthalmology  [address]  [place] [address]  Phone: [phone]  Fax:   Follow Up Time: 4-6 Days

## 2025-07-04 NOTE — ED ADULT NURSE NOTE - CHIEF COMPLAINT QUOTE
Rt eye redness and tearing for 3 days now, seen at [facility] and was prescribed with Polytrim, h/o Glaucoma

## 2025-07-04 NOTE — ED PROVIDER NOTE - ATTENDING CONTRIBUTION TO CARE
76 y/o M w/ hx of glaucoma presents to ED c/o constant R-eye pain, redness, and excessive tear production for 3 days.   fluorescein stains positive for corneal abrasion  ocular pressure normal  erythromycin and dc home

## 2025-07-04 NOTE — ED PROVIDER NOTE - OBJECTIVE STATEMENT
76 y/o M w/ hx of glaucoma presents to ED c/o constant R-eye pain and redness for 3 days. Patient describes the pain as needle-like, and does not worsen with extraocular movement. Patient went to Urgent Care yesterday and was prescribed Polytrim drops. Patient reports no improvement and increased irritation from using the drops and 74 y/o M w/ hx of glaucoma presents to ED c/o constant R-eye pain, redness, and excessive tear production for 3 days. Patient denies any complaints regarding the Left eye. Patient describes the pain as sharp and needle-like, and does not worsen with extraocular movement. Patient went to Urgent Care yesterday and was prescribed Polytrim drops. Patient reports no improvement and increased irritation from using the drops and stopped after using them 3 times today. Patient endorses photophobia and blurry vision in the right eye. Patient denies ocular trauma, fever, chills, dizziness, difficulty ambulating, n/v, d/c.     Pharm:  [address]

## 2025-07-04 NOTE — ED ADULT TRIAGE NOTE - CHIEF COMPLAINT QUOTE
Rt eye redness and tearing for 3 days now, seen at [facility] and was prescribed with Polytrim, h/o Glaucoma

## 2025-07-04 NOTE — ED PROVIDER NOTE - CLINICAL SUMMARY MEDICAL DECISION MAKING FREE TEXT BOX
04/08/2020      General Call:   Who is calling:  Patient  Reason for Call:  Neprology questions  What are your questions or concerns:  Due to Insurance patient will be transferring all of his Neprhology care to [place]. Patient called wondering what should be done for him to officially start over and if [facility] would like an appt or if he should be directed to [facility] for referral?  Date of last appointment with provider: 01/22/2020  Okay to leave a detailed message:Yes at Cell number on file:    Telephone Information:   Mobile [phone]                             Snellen test showed 20/50 vision b/l  Fluorescein stain showed half-moon shaped abrasion on the lower portion of the cornea  Tonometry was wnl Patient w/ pmh of T2DM and glaucoma, presented with R. eye tearing and erythema, with pain. Snellen test showed 20/50 vision b/l. Fluorescein stain showed half-moon shaped abrasion on the lower portion of the cornea. Tonometry was wnl. Erythromycin given here and sent prescription for 7 days of erythromycin ointment to R. eye and ophthalmology followup. Patient agreeable with plan.

## 2025-07-04 NOTE — ED PROVIDER NOTE - CCCP TRG CHIEF CMPLNT
Medical Social Work    LSW submitted PASRR. Screen still running.     rt eye redness and tearing x3 days/eye redness

## 2025-07-04 NOTE — ED PROVIDER NOTE - PHYSICAL EXAMINATION
CONSTITUTIONAL: Well appearing, awake, alert, and in no apparent distress.  HEAD: normocephalic, atraumatic  EYES: Pupils round reactive to light b/l. EOMI. Right eye is injected with excessive tear production.   ENT: oral mucosa moist  CARDIAC: regular rate and rhythm; no murmurs, rubs, or gallops  RESPIRATORY: normal breath sounds, clear to ascultation bilaterally, no rales, rhonchi, wheezing  GASTROINTESTINAL: abdomen nondistended, soft, nontender, no guarding, rebound tenderness  MSK: Spine appears normal, range of motion is not limited, no muscle or joint tenderness  NEURO: Alert and oriented, no focal deficits, no motor or sensory deficits.  SKIN: Skin normal color for race, warm, dry and intact. No evidence of rash.  PSYCH: appropriate mood and affect

## 2025-07-04 NOTE — ED PROVIDER NOTE - PATIENT PORTAL LINK FT
You can access the FollowMyHealth Patient Portal offered by [facility] by registering at the following website: [email or web address]. By joining [employer]’s FollowMyHealth portal, you will also be able to view your health information using other applications (apps) compatible with our system.

## 2025-07-04 NOTE — ED PROVIDER NOTE - NSFOLLOWUPINSTRUCTIONS_ED_ALL_ED_FT
You were found to have an abrasion in your right eye which is the likely cause of your tearing and pain. We gave you some anti-biotic ointment that will also help with inflammation. The ointment was sent to your [facility] pharmacy at [address] [place] [address]. Please use the every 6 hours for one week and followup with an ophthalmologist. If your vision starts worsening, the pain worsens, you notice discharge, reddening of the skin around the eyes or fevers, please return to the emergency department.

## 2025-07-04 NOTE — ED ADULT NURSE NOTE - OBJECTIVE STATEMENT
Rt eye redness and tearing for 3 days now, seen at [facility] and was prescribed with Polytrim, h/o Glaucoma

## 2025-07-09 ENCOUNTER — EMERGENCY (EMERGENCY)
Facility: HOSPITAL | Age: 75
LOS: 1 days | End: 2025-07-09
Attending: STUDENT IN AN ORGANIZED HEALTH CARE EDUCATION/TRAINING PROGRAM
Payer: COMMERCIAL

## 2025-07-09 VITALS
HEART RATE: 83 BPM | DIASTOLIC BLOOD PRESSURE: 78 MMHG | RESPIRATION RATE: 17 BRPM | SYSTOLIC BLOOD PRESSURE: 129 MMHG | OXYGEN SATURATION: 96 %

## 2025-07-09 VITALS
SYSTOLIC BLOOD PRESSURE: 123 MMHG | TEMPERATURE: 98 F | HEIGHT: 66 IN | RESPIRATION RATE: 17 BRPM | DIASTOLIC BLOOD PRESSURE: 75 MMHG | HEART RATE: 83 BPM | WEIGHT: 134.92 LBS | OXYGEN SATURATION: 96 %

## 2025-07-09 RX ORDER — OFLOXACIN 3 MG/ML
1 SOLUTION OPHTHALMIC ONCE
Refills: 0 | Status: COMPLETED | OUTPATIENT
Start: 2025-07-09 | End: 2025-07-09

## 2025-07-09 RX ORDER — OFLOXACIN 3 MG/ML
1 SOLUTION OPHTHALMIC
Qty: 1 | Refills: 0
Start: 2025-07-09 | End: 2025-07-15

## 2025-07-09 RX ORDER — TETRACAINE HYDROCHLORIDE 5 MG/ML
1 SOLUTION OPHTHALMIC ONCE
Refills: 0 | Status: COMPLETED | OUTPATIENT
Start: 2025-07-09 | End: 2025-07-09

## 2025-07-09 RX ADMIN — OFLOXACIN 1 DROP(S): 3 SOLUTION OPHTHALMIC at 12:31

## 2025-07-09 RX ADMIN — TETRACAINE HYDROCHLORIDE 1 DROP(S): 5 SOLUTION OPHTHALMIC at 09:36

## 2025-07-09 NOTE — ED PROVIDER NOTE - PHYSICAL EXAMINATION
Physical Exam:  General: NAD, Conversive  Eyes: L eye with conjunctival injection. EOMI. PERRL. Visual acuity 20/70 in L eye, 20/100 in R eye uncorrected.   Neck: No JVD  Lungs: Clear to auscultation bilaterally, no wheeze, no rhonchi  Heart: Normal S1, S2, no murmurs  Abdomen: Soft, nontender, nondistended,  Neurologic: Non-focal Physical Exam:  General: NAD, Conversive  Eyes: L eye with conjunctival injection. EOMI. PERRL. Visual acuity 20/70 in L eye, 20/100 in R eye uncorrected. Pressure 8 in R eye, 10 in L eye. ARea of fluorescein uptake in R eye over cornea  Neck: No JVD  Lungs: Clear to auscultation bilaterally, no wheeze, no rhonchi  Heart: Normal S1, S2, no murmurs  Abdomen: Soft, nontender, nondistended,  Neurologic: Non-focal

## 2025-07-09 NOTE — ED ADULT TRIAGE NOTE - CHIEF COMPLAINT QUOTE
pt was seen here 7/4 and was dx with right eye abrasion  has been using ointment since and pain has not improved

## 2025-07-09 NOTE — ED PROVIDER NOTE - NSFOLLOWUPINSTRUCTIONS_ED_ALL_ED_FT
The ophthalmologist will call you in the next day to schedule an appointment. Use the erythromycin ointment you already have at night and use the prescribed ofloxacin 4 times a day. Return to the ED for worsening vision, worsening pain, or if you cannot see the ophthalmologist in the next 2 days.

## 2025-07-09 NOTE — ED PROVIDER NOTE - NS ED ATTENDING STATEMENT MOD
I have seen and examined this patient and fully participated in the care of this patient as the teaching attending.  The service was shared with the MERLE.  I reviewed and verified the documentation.

## 2025-07-09 NOTE — ED PROVIDER NOTE - PATIENT'S PREFERRED PRONOUN
Him/He CHF, left ventricular Foot ulcer PE (pulmonary thromboembolism) CLL (chronic lymphocytic leukemia)

## 2025-07-09 NOTE — ED PROVIDER NOTE - PROGRESS NOTE DETAILS
Te: Discussed with optho Dr. Yap, will review images and make recommendations. Te PGY5 Fellow: Dr. Yap recommend addition of ofloxacin drops QID, continued use of erythromycin at night, and they will follow up with patient in the next day. Te PGY5 Fellow: Dr. Yap reviewed case and pictures, recommend addition of ofloxacin drops QID, continued use of erythromycin at night, and they will follow up with patient in the next day.

## 2025-07-09 NOTE — ED PROVIDER NOTE - OBJECTIVE STATEMENT
This is a 75-year-old male with history of diabetes, glaucoma, CKD presenting for eye pain.  Patient reports about a week ago he developed onset of right eye pain, redness, tearing.  He does not remember any trauma to the eye.  He denies any symptoms in the other eyes, headache, other symptoms.  He was seen in the ED 5 days ago and told he had a "scratch" on his eyes and prescribed erythromycin ointment which he has been using as prescribed but states that his symptoms are persistent.  He additionally now feels like he is having some blurry vision in the right eye.  He feels like his symptoms are worsening.

## 2025-07-09 NOTE — ED ADULT NURSE NOTE - NSFALLUNIVINTERV_ED_ALL_ED
Bed/Stretcher in lowest position, wheels locked, appropriate side rails in place/Call bell, personal items and telephone in reach/Instruct patient to call for assistance before getting out of bed/chair/stretcher/Non-slip footwear applied when patient is off stretcher/[place] to call system/Physically safe environment - no spills, clutter or unnecessary equipment/Purposeful proactive rounding/Room/bathroom lighting operational, light cord in reach

## 2025-07-09 NOTE — ED PROVIDER NOTE - PATIENT PORTAL LINK FT
You can access the FollowMyHealth Patient Portal offered by [facility] by registering at the following website: [email or web address]. By joining [employer]’s FollowMyHealth portal, you will also be able to view your health information using other applications (apps) compatible with our system.

## 2025-07-09 NOTE — ED PROVIDER NOTE - NSFOLLOWUPCLINICS_GEN_ALL_ED_FT
[facility] - Ophthalmology  Ophthalmology  [address], [facility] [address]  [place] [address]  Phone: [phone]  Fax:

## 2025-07-09 NOTE — ED ADULT NURSE NOTE - OBJECTIVE STATEMENT
75Y M AXO4 PMH glaucoma, DM, CKD presented to the ED from home c/o worsening R eye pain. Pt endorses atraumatic R eye pain 5 days ago was seen in ED and given erythromycin for R eye abrasion. pt states symptoms are worsening and now has R eye blurriness. Upon arrival to the ED, the pt is well appearing, has bilateral even and unlabored chest rise, and ambulatory with steady gait. Upon assessment, pt has even and bilateral peripheral pulses, ROM, PERRLA, gross neuro intact. Pt denies fevers, chills, chest pain, SOB, n/v/d, headache, dizziness, numbness or tingling of extremities, urinary symptoms, and black or bloody stools. Comfort and safety provided, bed in lowest position, side rails up, and call bell within reach.

## 2025-07-09 NOTE — ED PROVIDER NOTE - CLINICAL SUMMARY MEDICAL DECISION MAKING FREE TEXT BOX
This is a 75-year-old male with history as above presenting for eye pain.  Vitals unremarkable, exam as above.  Given patient has been describing appropriate treatment and still has persistent corneal defect, now with visual deficit we will plan to discuss with ophthalmology.

## 2025-07-09 NOTE — ED PROVIDER NOTE - ATTENDING CONTRIBUTION TO CARE
Dr. Lynne: I have personally performed a face to face bedside history and physical examination of this patient. I have discussed the history, examination, review of systems, assessment and plan of management with the resident. I have reviewed the electronic medical record and amended it to reflect my history, review of systems, physical exam, assessment and plan.      75-year-old male with history of diabetes, glaucoma, CKD, presenting for eye pain.  Patient was seen in the ED a week ago and discharged with erythromycin ointment for corneal abrasion.  Patient returns today stating that symptoms are not improving despite using the medication as directed.  On exam, the right conjunctiva is injected.  Pupil 3 equal and reactive.  On exam, worsening visual acuity compared to the patient's last visit.  No increased extraocular pressures.  Will have patient evaluated by ophthalmology given second visit and lack of improvement in symptoms despite compliance with therapy.  Dispo pending above.

## 2025-07-09 NOTE — ED ADULT NURSE NOTE - NSFALLASSESSNEED_ED_ALL_ED
Radiology Post-Procedure Note    Pre Op Diagnosis: left liver mass    Post Op Diagnosis: left liver mass    Procedure: left liver biopsy    Procedure performed by: Rahul Olson MD; Yemi Orozco (resident)    Written Informed Consent Obtained: Yes    Specimen Removed: YES 4 core specimens    Estimated Blood Loss: Minimal    Findings:   Using CT guidance a 19g sheath needle was placed into a left liver mass. 20g monopty biopsy gun used to take 4 core biopsy samples. Specimen sent to pathology.     Patient tolerated procedure well.    Ok to resume heparin drip in 3 hours. Pt stable for discharge tomorrow from IR perspective.    Yemi Orozco  Radiology PGY-4  
no